# Patient Record
Sex: MALE | NOT HISPANIC OR LATINO | URBAN - METROPOLITAN AREA
[De-identification: names, ages, dates, MRNs, and addresses within clinical notes are randomized per-mention and may not be internally consistent; named-entity substitution may affect disease eponyms.]

---

## 2020-07-02 ENCOUNTER — OFFICE VISIT CONVERTED (OUTPATIENT)
Dept: GASTROENTEROLOGY | Facility: CLINIC | Age: 70
End: 2020-07-02
Attending: PHYSICIAN ASSISTANT

## 2020-08-24 ENCOUNTER — HOSPITAL ENCOUNTER (OUTPATIENT)
Dept: PREADMISSION TESTING | Facility: HOSPITAL | Age: 70
Discharge: HOME | End: 2020-08-24
Attending: INTERNAL MEDICINE

## 2020-08-24 LAB — SARS-COV-2 RNA SPEC QL NAA+PROBE: NOT DETECTED

## 2021-05-10 NOTE — H&P
"   History and Physical      Patient Name: Chapo Cameron   Patient ID: [id number]   Sex: Male   YOB: 1950    Referring Provider: Jack Boggs    Visit Date: July 2, 2020    Provider: Nathan Agudelo PA-C   Location: [facility]   Location Address: [address] [place] [address]  [id number]   Location Phone: [phone]          Chief Complaint  · \"Age over 50\"  · \"Family history of colon cancer\"      History Of Present Illness  The patient is a 70 year old /White male, who presents on referral from Jack Boggs, for gastroenterology evaluation of screening colonoscopy for his risk factors for colon cancer. His risk factors include a first degree relative with colon cancer(see Fm Hx).   There is no recent history of rectal bleeding. He has no pertinent additional complaints and denies back pain, narrow stools, and weight loss.      His most recent lab work showed hemoglobin 12.5, MCV 91.5.  Patient denies any melena, rectal bleeding, or abdominal pain.      His mother and sister both had colon cancer.  The patient has not had a colonoscopy.       Past Medical History  Gastric Ulcer         Medication List  hydrochlorothiazide 12.5 mg oral tablet; loratadine 10 mg oral tablet; Suprep Bowel Prep Kit 17.5-3.13-1.6 gram oral recon soln         Allergy List  aspirin       Allergies Reconciled  Family Medical History  Family history of colon cancer         Social History  Alcohol; Tobacco (Never)         Immunizations  Reviewed None Changed         Review of Systems  · Constitutional  o Denies  o : chills, fever  · Eyes  o Denies  o : blurred vision, changes in vision  · Cardiovascular  o Denies  o : chest pain  · Respiratory  o Denies  o : shortness of breath  · Gastrointestinal  o Denies  o : nausea, vomiting  · Genitourinary  o Denies  o : dysuria, blood in urine  · Integument  o Denies  o : rash  · Neurologic  o Denies  o : tingling or " "numbness  · Musculoskeletal  o Denies  o : joint pain  · Endocrine  o Denies  o : weight gain, weight loss  · Psychiatric  o Denies  o : anxiety, depression      Vitals  Date Time BP Position Site L\R Cuff Size HR RR TEMP (F) WT  HT  BMI kg/m2 BSA m2 O2 Sat HC       07/02/2020 01:52 /75 Sitting    81 - R   229lbs 0oz 6'  6\" 26.46 2.39 99 %          Physical Examination  · Constitutional  o Appearance  o : Well-nourished, well developed, alert, in no acute distress, alert and oriented X 3.  · Eyes  o Vision  o :   § Visual Fields  § : eyes move symmetrical in all directions  o Sclerae  o : sclerae anicteric  o Pupils and Irises  o : pupils equal and symetrical  · Neck  o Inspection/Palpation  o : Trachea is midline, no adenopathy  o Thyroid  o : Thyroid is not enlarged  · Respiratory  o Respiratory Effort  o : Breathing is unlabored.  o Inspection of Chest  o : normal appearance, no retractions  o Auscultation of Lungs  o : Chest is clear to auscultation bilaterally.  · Cardiovascular  o Heart  o :   § Auscultation of Heart  § : no murmurs, rubs, or gallops  · Gastrointestinal  o Abdominal Examination  o : Abdomen is soft, nontender to palpation, with normal active bowel sounds, no appreciable hepatosplenomegaly.  · Skin and Subcutaneous Tissue  o General Inspection  o : Skin is without focal lesions. Skin turgor is normal.  · Psychiatric  o Mood and Affect  o : Mood and affect are appropriate to circumstances.          Assessment  · Family history of colon cancer     V16.0/Z80.0      Plan  · Orders  o Consent for Colonoscopy Screening -Possible risk/complications, benefits, and alternatives to surgical or invasive procedure have been explained to patient and/or legal gaurdian. -Patient has been evaluated and can tolerate anethesia and/or sedation. Risk, benefits, and alternatives to anesthesia and sedation have been explained to patient or legal gaurdian. () - V16.0/Z80.0 - " 07/02/2020  · Medications  o Transition of Care or Provider Policy  · Instructions  o Discussion and Plan: The patient has the appropriate risk factors for a screening colonoscopy. I have reviewed the indications for colonoscopy with possible biopsy in detail with the patient. The benefits, risks and complications were presented to the patient.   o Schedule colonoscopy with possible biopsy  o Follow-up will be determined at the completion of the colonoscopy  o Handouts given with bowel prep instructions            Electronically Signed by: ANDERSON Martinez-RONY -Author on July 2, 2020 01:57:03 PM  Electronically Co-signed by: Bob Monson MD -Reviewer on July 2, 2020 05:58:06 PM

## 2021-05-15 VITALS
HEART RATE: 81 BPM | BODY MASS INDEX: 26.49 KG/M2 | OXYGEN SATURATION: 99 % | SYSTOLIC BLOOD PRESSURE: 152 MMHG | WEIGHT: 229 LBS | HEIGHT: 78 IN | DIASTOLIC BLOOD PRESSURE: 75 MMHG